# Patient Record
Sex: MALE | Race: WHITE | NOT HISPANIC OR LATINO | URBAN - METROPOLITAN AREA
[De-identification: names, ages, dates, MRNs, and addresses within clinical notes are randomized per-mention and may not be internally consistent; named-entity substitution may affect disease eponyms.]

---

## 2021-06-10 ENCOUNTER — TELEPHONE ENCOUNTER (OUTPATIENT)
Dept: URBAN - METROPOLITAN AREA CLINIC 23 | Facility: CLINIC | Age: 31
End: 2021-06-10

## 2021-06-29 ENCOUNTER — WEB ENCOUNTER (OUTPATIENT)
Dept: URBAN - METROPOLITAN AREA CLINIC 35 | Facility: CLINIC | Age: 31
End: 2021-06-29

## 2021-07-01 ENCOUNTER — OFFICE VISIT (OUTPATIENT)
Dept: URBAN - METROPOLITAN AREA CLINIC 33 | Facility: CLINIC | Age: 31
End: 2021-07-01

## 2021-07-01 VITALS
OXYGEN SATURATION: 97 % | BODY MASS INDEX: 27.92 KG/M2 | WEIGHT: 195 LBS | HEART RATE: 77 BPM | HEIGHT: 70 IN | SYSTOLIC BLOOD PRESSURE: 138 MMHG | DIASTOLIC BLOOD PRESSURE: 78 MMHG

## 2021-07-01 RX ORDER — FAMOTIDINE 20 MG/1
2 TABLETS AT BEDTIME AS NEEDED TABLET, FILM COATED ORAL ONCE A DAY
Qty: 60 | OUTPATIENT
Start: 2021-07-01

## 2021-07-01 RX ORDER — PANTOPRAZOLE SODIUM 40 MG/1
1 TABLET TABLET, DELAYED RELEASE ORAL ONCE A DAY
Qty: 30 | Status: ACTIVE | COMMUNITY

## 2021-07-01 RX ORDER — FAMOTIDINE 20 MG/1
1 TABLET AT BEDTIME AS NEEDED TABLET, FILM COATED ORAL ONCE A DAY
Qty: 30 | Status: ACTIVE | COMMUNITY

## 2021-07-01 RX ORDER — POLYETHYLENE GLYCOL 3350, SODIUM SULFATE, SODIUM CHLORIDE, POTASSIUM CHLORIDE, ASCORBIC ACID, SODIUM ASCORBATE 140-9-5.2G
500 ML KIT ORAL
Qty: 1 KIT | Refills: 0 | OUTPATIENT
Start: 2021-07-01

## 2021-07-01 NOTE — HPI-MIGRATED HPI
;   ;   ;     Rectal Bleeding : 31 year old male who presents today for a consultation for rectal bleeding. Onset 3-4 weeks ago.  Described as bright red blood that was present on tissue and within the toilet bowl, not enough to stain the water red.  Symptoms occurred with 6 bowel movements each day over the course of 3 days.    Colonoscopy performed on (6/8/2017) by Dr. Wesley Mcdaniel with findings of  Hemorrhoids, Transverse polyp and Dr. Mcdaniel noting polyp was precancerous and to repeat in 5 years.   Maternal Grandfather had Colon cancer in his 40's.;   IBS : Patient has years history of IBS.  Admits IBS flare up occur less than once a month.  Symptoms consist of 5-10 loose to watery BM's per day with associated fecal urgency, LLQ abdominal cramping, generalized abdominal bloating.     Admit consuming fatty foods has been aggravating factor.    Has been treated with Dicyclomine 10 mg a couple times a year over the past 5 years.  Currently admits 2-3 formed bowel movements per day without strain.  Currently denies blood, mucus, or melena in stools.     He has been treated with Bentyl in the past with relief of symptoms. Patient denies fecal incontinence (conscious/unconscious).     He report his mother has a history of Crohn's.    Admits a family history of colon cancer with his father.  Colonoscopy performed on (6/8/2017) by Dr. Wesley Mcdaniel with findings of  Hemorrhoids, Transverse polyp and Dr. Mcdaniel noting polyp was precancerous and to repeat in 5 years. ;   Gastroesophageal Reflux : Patient has a long term history (15 years ago)  of GERD symptoms.  He was diagnosed with GERD w/o esophagitis with biopsy pathology noting Chronic Gastritis, Chronic Duodenitis with foveolar metaplasia via EGD performed on (2/10/2017) by MARINE Mcdaniel.   Symptoms are described as burning sensation in the esophagus, mid chest and epigastrium.       He has been treated with Pantoprazole 40 mg 1 QD for a few year then changed to  every 4-5 days with relief.  Continue to take  Pantoprazole 40 mg 1 QD and began to take Pepcid 20 mg 1 QD 1.5 years ago with relief.   Admits his father has a history of GERD.  Paternal cousin diagnosed with Garcia's esophagus in his 30's.;

## 2021-07-01 NOTE — EXAM-MIGRATED EXAMINATIONS
GENERAL APPEARANCE: - alert, in no acute distress, well developed, well nourished;   HEAD: - normocephalic, atraumatic;   EYES: - sclera anicteric bilaterally;   EARS: - normal;   ORAL CAVITY: - mucosa moist.;   THROAT: - clear;   NECK/THYROID: - neck supple, full range of motion, no cervical lymphadenopathy, no thyroid nodules, no thyromegaly, trachea midline;   LYMPH NODES: - no cervical adenopathy, no supraclavicular adenopathy, no periumbilical adenopathy;   SKIN: - no suspicious lesions, warm and dry, no spider angiomata, palmar erythema or icterus;   HEART: - no murmurs, regular rate and rhythm, S1, S2 normal;   LUNGS: - clear to auscultation bilaterally, good air movement, no wheezes, rales, rhonchi;   ABDOMEN: - bowel sounds present, no masses palpable, no organomegaly , no rebound tenderness, soft, nontender, nondistended;   RECTAL: - deferred by patient;   MUSCULOSKELETAL: - normal posture, normal gait and station, no decreased range of motion;   EXTREMITIES: - no clubbing, cyanosis, or edema;   NEUROLOGIC: - cranial nerves 2-12 grossly intact;   PSYCH: - cooperative with exam, mood/affect full range;

## 2021-07-28 ENCOUNTER — TELEPHONE ENCOUNTER (OUTPATIENT)
Dept: URBAN - METROPOLITAN AREA CLINIC 35 | Facility: CLINIC | Age: 31
End: 2021-07-28

## 2021-08-18 ENCOUNTER — OFFICE VISIT (OUTPATIENT)
Dept: URBAN - METROPOLITAN AREA SURGERY CENTER 8 | Facility: SURGERY CENTER | Age: 31
End: 2021-08-18

## 2021-08-18 ENCOUNTER — TELEPHONE ENCOUNTER (OUTPATIENT)
Dept: URBAN - METROPOLITAN AREA CLINIC 35 | Facility: CLINIC | Age: 31
End: 2021-08-18

## 2021-08-18 RX ORDER — NIFEDIPINE
0.3%, LIDOCAINE 5% AND ALOE, PEA SIZE AMOUNT POWDER (GRAM) MISCELLANEOUS TID
Qty: 30 GRAM | Refills: 1 | OUTPATIENT
Start: 2021-08-18

## 2021-09-01 ENCOUNTER — TELEPHONE ENCOUNTER (OUTPATIENT)
Dept: URBAN - METROPOLITAN AREA CLINIC 35 | Facility: CLINIC | Age: 31
End: 2021-09-01

## 2021-09-01 PROBLEM — 197125005 IRRITABLE BOWEL SYNDROME WITH DIARRHEA: Status: ACTIVE | Noted: 2021-07-01

## 2021-09-01 PROBLEM — 266435005 GASTRO-ESOPHAGEAL REFLUX DISEASE WITHOUT ESOPHAGITIS: Status: ACTIVE | Noted: 2021-06-30

## 2021-09-01 PROBLEM — 196735001 CSG - CHRONIC SUPERFICIAL GASTRITIS: Status: ACTIVE | Noted: 2021-09-01

## 2021-09-02 ENCOUNTER — OFFICE VISIT (OUTPATIENT)
Dept: URBAN - METROPOLITAN AREA CLINIC 33 | Facility: CLINIC | Age: 31
End: 2021-09-02

## 2021-09-02 VITALS — WEIGHT: 185 LBS | BODY MASS INDEX: 26.48 KG/M2 | HEIGHT: 70 IN

## 2021-09-02 RX ORDER — PANTOPRAZOLE SODIUM 40 MG/1
1 TABLET TABLET, DELAYED RELEASE ORAL ONCE A DAY
OUTPATIENT

## 2021-09-02 RX ORDER — NIFEDIPINE
0.3%, LIDOCAINE 5% AND ALOE, PEA SIZE AMOUNT POWDER (GRAM) MISCELLANEOUS TID
Qty: 30 GRAM | Refills: 1 | Status: ON HOLD | COMMUNITY
Start: 2021-08-18

## 2021-09-02 RX ORDER — POLYETHYLENE GLYCOL 3350, SODIUM SULFATE, SODIUM CHLORIDE, POTASSIUM CHLORIDE, ASCORBIC ACID, SODIUM ASCORBATE 140-9-5.2G
500 ML KIT ORAL
Qty: 1 KIT | Refills: 0 | Status: DISCONTINUED | COMMUNITY
Start: 2021-07-01

## 2021-09-02 RX ORDER — NIFEDIPINE
0.3%, LIDOCAINE 5% AND ALOE, PEA SIZE AMOUNT POWDER (GRAM) MISCELLANEOUS TID
OUTPATIENT
Start: 2021-08-18

## 2021-09-02 RX ORDER — FAMOTIDINE 20 MG/1
1 TABLET AT BEDTIME AS NEEDED TABLET, FILM COATED ORAL ONCE A DAY
OUTPATIENT

## 2021-09-02 RX ORDER — FAMOTIDINE 20 MG/1
1 TABLET AT BEDTIME AS NEEDED TABLET, FILM COATED ORAL ONCE A DAY
Qty: 30 | Status: ACTIVE | COMMUNITY

## 2021-09-02 RX ORDER — PANTOPRAZOLE SODIUM 40 MG/1
1 TABLET TABLET, DELAYED RELEASE ORAL ONCE A DAY
Qty: 30 | Status: ACTIVE | COMMUNITY

## 2021-09-02 RX ORDER — FAMOTIDINE 20 MG/1
2 TABLETS AT BEDTIME AS NEEDED TABLET, FILM COATED ORAL ONCE A DAY
Qty: 60 | Status: DISCONTINUED | COMMUNITY
Start: 2021-07-01

## 2021-09-02 NOTE — HPI-MIGRATED HPI
;   ;   ;     Rectal Bleeding : Denies episodes of rectal bleeding since the day following his procedures.  He used the Nifedipine 0.3%, 5% lidocaine and aloe, TID pea sized x 2-3 dys and stopped, due to not feeling comfortable with applying rectal medication.   Of note, patient state he did not complete labs since he had that test done about 5 years ago in [facility]. at [facility].      Last visit (7/1/2021) 31 year old male who presents today for a consultation for rectal bleeding. Onset 3-4 weeks ago.  Described as bright red blood that was present on tissue and within the toilet bowl, not enough to stain the water red.  Symptoms occurred with 6 bowel movements each day over the course of 3 days.    Colonoscopy performed on (6/8/2017) by Dr. Wesley Mcdaniel with findings of  Hemorrhoids, Transverse polyp and Dr. Mcdaniel noting polyp was precancerous and to repeat in 5 years.   Maternal Grandfather had Colon cancer in his 40's.;   IBS : Currently admits 2 formed bowel movements per day without strain.  Denies blood, mucus, or melena in stools, rectal pain/bleeding, pruritus ani.    Admits eating a clean diet has improved symptoms.  He has lost 10 lbs and feel well.   Last visit (7/1/2021) Patient has years history of IBS.  Admits IBS flare up occur less than once a month.  Symptoms consist of 5-10 loose to watery BM's per day with associated fecal urgency, LLQ abdominal cramping, generalized abdominal bloating.     Admit consuming fatty foods has been aggravating factor.    Has been treated with Dicyclomine 10 mg a couple times a year over the past 5 years.  Currently admits 2-3 formed bowel movements per day without strain.  Currently denies blood, mucus, or melena in stools.     He has been treated with Bentyl in the past with relief of symptoms. Patient denies fecal incontinence (conscious/unconscious).     He report his mother has a history of Crohn's.    Admits a family history of colon cancer with his father.  Colonoscopy performed on (6/8/2017) by Dr. Wesley Mcdaniel with findings of  Hemorrhoids, Transverse polyp and Dr. Mcdaniel noting polyp was precancerous and to repeat in 5 years.;   Gastroesophageal Reflux : Patient presents toay after his EGD, Colonoscopy, Labs and follow up of GERD.  He denies any complicationms following procedures. He admit improvement wiht the use of Famotidine 20 MG, 1 QAM.  He will take Protonix 40 mg 1 QD 2-3 days a week for breakthrough symptoms.    Symptoms are described as burning sensation in the esophagus, mid chest and occasional in the epigastrium.       Last visit (7/1/2021) Patient has a long term history (15 years ago)  of GERD symptoms.  He was diagnosed with GERD w/o esophagitis with biopsy pathology noting Chronic Gastritis, Chronic Duodenitis with foveolar metaplasia via EGD performed on (2/10/2017) by [facility] Dr. Wesley Mcdaniel.   Symptoms are described as burning sensation in the esophagus, mid chest and epigastrium.       He has been treated with Pantoprazole 40 mg 1 QD for a few year then changed to  every 4-5 days with relief.  Continue to take  Pantoprazole 40 mg 1 QD and began to take Pepcid 20 mg 1 QD 1.5 years ago with relief.   Admits his father has a history of GERD.  Paternal cousin diagnosed with Garcia's esophagus in his 30's.;